# Patient Record
Sex: FEMALE | Race: BLACK OR AFRICAN AMERICAN | NOT HISPANIC OR LATINO
[De-identification: names, ages, dates, MRNs, and addresses within clinical notes are randomized per-mention and may not be internally consistent; named-entity substitution may affect disease eponyms.]

---

## 2017-07-28 ENCOUNTER — APPOINTMENT (OUTPATIENT)
Dept: NUCLEAR MEDICINE | Facility: HOSPITAL | Age: 57
End: 2017-07-28

## 2017-07-28 ENCOUNTER — OUTPATIENT (OUTPATIENT)
Dept: OUTPATIENT SERVICES | Facility: HOSPITAL | Age: 57
LOS: 1 days | Discharge: HOME | End: 2017-07-28
Payer: MEDICAID

## 2017-07-28 DIAGNOSIS — I10 ESSENTIAL (PRIMARY) HYPERTENSION: ICD-10-CM

## 2017-07-28 DIAGNOSIS — R07.9 CHEST PAIN, UNSPECIFIED: ICD-10-CM

## 2017-07-28 DIAGNOSIS — E78.5 HYPERLIPIDEMIA, UNSPECIFIED: ICD-10-CM

## 2017-07-28 DIAGNOSIS — R06.02 SHORTNESS OF BREATH: ICD-10-CM

## 2017-07-28 PROBLEM — Z00.00 ENCOUNTER FOR PREVENTIVE HEALTH EXAMINATION: Status: ACTIVE | Noted: 2017-07-28

## 2017-07-28 PROCEDURE — 78452 HT MUSCLE IMAGE SPECT MULT: CPT | Mod: 26

## 2021-03-03 ENCOUNTER — OUTPATIENT (OUTPATIENT)
Dept: OUTPATIENT SERVICES | Facility: HOSPITAL | Age: 61
LOS: 1 days | Discharge: HOME | End: 2021-03-03
Payer: MEDICARE

## 2021-03-03 DIAGNOSIS — F10.10 ALCOHOL ABUSE, UNCOMPLICATED: ICD-10-CM

## 2021-08-16 ENCOUNTER — TRANSCRIPTION ENCOUNTER (OUTPATIENT)
Age: 61
End: 2021-08-16

## 2021-11-23 ENCOUNTER — TRANSCRIPTION ENCOUNTER (OUTPATIENT)
Age: 61
End: 2021-11-23

## 2022-08-20 ENCOUNTER — NON-APPOINTMENT (OUTPATIENT)
Age: 62
End: 2022-08-20

## 2022-10-10 ENCOUNTER — NON-APPOINTMENT (OUTPATIENT)
Age: 62
End: 2022-10-10

## 2023-11-09 ENCOUNTER — EMERGENCY (EMERGENCY)
Facility: HOSPITAL | Age: 63
LOS: 0 days | Discharge: HOME | End: 2023-11-09
Attending: STUDENT IN AN ORGANIZED HEALTH CARE EDUCATION/TRAINING PROGRAM
Payer: COMMERCIAL

## 2023-11-09 VITALS
TEMPERATURE: 98 F | HEIGHT: 65 IN | OXYGEN SATURATION: 98 % | HEART RATE: 56 BPM | DIASTOLIC BLOOD PRESSURE: 79 MMHG | SYSTOLIC BLOOD PRESSURE: 150 MMHG | WEIGHT: 220.02 LBS | RESPIRATION RATE: 18 BRPM

## 2023-11-09 VITALS
SYSTOLIC BLOOD PRESSURE: 131 MMHG | OXYGEN SATURATION: 98 % | DIASTOLIC BLOOD PRESSURE: 70 MMHG | TEMPERATURE: 98 F | HEART RATE: 63 BPM | RESPIRATION RATE: 16 BRPM

## 2023-11-09 DIAGNOSIS — M54.50 LOW BACK PAIN, UNSPECIFIED: ICD-10-CM

## 2023-11-09 DIAGNOSIS — M25.512 PAIN IN LEFT SHOULDER: ICD-10-CM

## 2023-11-09 DIAGNOSIS — M25.511 PAIN IN RIGHT SHOULDER: ICD-10-CM

## 2023-11-09 DIAGNOSIS — Z98.890 OTHER SPECIFIED POSTPROCEDURAL STATES: Chronic | ICD-10-CM
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
## 2023-11-09 DIAGNOSIS — M54.2 CERVICALGIA: ICD-10-CM

## 2023-11-09 DIAGNOSIS — M54.6 PAIN IN THORACIC SPINE: ICD-10-CM

## 2023-11-09 PROCEDURE — 72125 CT NECK SPINE W/O DYE: CPT | Mod: 26,MA

## 2023-11-09 PROCEDURE — 72128 CT CHEST SPINE W/O DYE: CPT | Mod: 26,MA

## 2023-11-09 PROCEDURE — 99284 EMERGENCY DEPT VISIT MOD MDM: CPT

## 2023-11-09 PROCEDURE — 71250 CT THORAX DX C-: CPT | Mod: 26,MA

## 2023-11-09 RX ORDER — LIDOCAINE 4 G/100G
2 CREAM TOPICAL ONCE
Refills: 0 | Status: COMPLETED | OUTPATIENT
Start: 2023-11-09 | End: 2023-11-09

## 2023-11-09 RX ORDER — ATORVASTATIN CALCIUM 80 MG/1
1 TABLET, FILM COATED ORAL
Refills: 0 | DISCHARGE

## 2023-11-09 RX ORDER — LISINOPRIL 2.5 MG/1
1 TABLET ORAL
Refills: 0 | DISCHARGE

## 2023-11-09 RX ORDER — METHOCARBAMOL 500 MG/1
1 TABLET, FILM COATED ORAL
Qty: 20 | Refills: 0
Start: 2023-11-09 | End: 2023-11-13

## 2023-11-09 RX ORDER — AMLODIPINE BESYLATE 2.5 MG/1
1 TABLET ORAL
Refills: 0 | DISCHARGE

## 2023-11-09 RX ORDER — METHOCARBAMOL 500 MG/1
750 TABLET, FILM COATED ORAL ONCE
Refills: 0 | Status: COMPLETED | OUTPATIENT
Start: 2023-11-09 | End: 2023-11-09

## 2023-11-09 RX ORDER — METFORMIN HYDROCHLORIDE 850 MG/1
1 TABLET ORAL
Refills: 0 | DISCHARGE

## 2023-11-09 RX ORDER — ACETAMINOPHEN 500 MG
975 TABLET ORAL ONCE
Refills: 0 | Status: COMPLETED | OUTPATIENT
Start: 2023-11-09 | End: 2023-11-09

## 2023-11-09 RX ORDER — ASPIRIN/CALCIUM CARB/MAGNESIUM 324 MG
1 TABLET ORAL
Refills: 0 | DISCHARGE

## 2023-11-09 RX ORDER — RANOLAZINE 500 MG/1
1 TABLET, FILM COATED, EXTENDED RELEASE ORAL
Refills: 0 | DISCHARGE

## 2023-11-09 RX ORDER — OXYCODONE HYDROCHLORIDE 5 MG/1
1 TABLET ORAL
Qty: 9 | Refills: 0
Start: 2023-11-09 | End: 2023-11-11

## 2023-11-09 RX ORDER — METOPROLOL TARTRATE 50 MG
1 TABLET ORAL
Refills: 0 | DISCHARGE

## 2023-11-09 RX ORDER — LIDOCAINE 4 G/100G
1 CREAM TOPICAL
Qty: 10 | Refills: 0
Start: 2023-11-09 | End: 2023-11-18

## 2023-11-09 RX ADMIN — METHOCARBAMOL 750 MILLIGRAM(S): 500 TABLET, FILM COATED ORAL at 09:08

## 2023-11-09 RX ADMIN — Medication 975 MILLIGRAM(S): at 08:50

## 2023-11-09 RX ADMIN — Medication 975 MILLIGRAM(S): at 09:15

## 2023-11-09 RX ADMIN — LIDOCAINE 2 PATCH: 4 CREAM TOPICAL at 09:09
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
## 2023-11-09 NOTE — ED PROVIDER NOTE - CARE PROVIDER_API CALL
Yolanda Anand  Orthopaedic Surgery  [facility], [address] [address]  Phone: [phone]  Fax: [phone]  Follow Up Time:
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
## 2023-11-09 NOTE — ED PROVIDER NOTE - PATIENT PORTAL LINK FT
You can access the FollowMyHealth Patient Portal offered by [facility] by registering at the following website: [email or web address]. By joining [facility]’s FollowMyHealth portal, you will also be able to view your health information using other applications (apps) compatible with our system.

## 2023-11-09 NOTE — ED ADULT NURSE NOTE - NSICDXPASTMEDICALHX_GEN_ALL_CORE_FT
PAST MEDICAL HISTORY:  Acute myocardial infarction     DM (diabetes mellitus)     High cholesterol     HTN (hypertension)
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
## 2024-01-04 PROCEDURE — 90832 PSYTX W PT 30 MINUTES: CPT | Mod: 95

## 2024-01-11 PROCEDURE — 90832 PSYTX W PT 30 MINUTES: CPT | Mod: 95

## 2024-01-18 PROCEDURE — 90834 PSYTX W PT 45 MINUTES: CPT

## 2024-02-29 PROCEDURE — 90832 PSYTX W PT 30 MINUTES: CPT | Mod: 93

## 2024-03-12 PROCEDURE — 98968 PH1 ASSMT&MGMT NQHP 21-30: CPT

## 2024-03-19 PROCEDURE — 98968 PH1 ASSMT&MGMT NQHP 21-30: CPT

## 2024-04-09 PROCEDURE — 98967 PH1 ASSMT&MGMT NQHP 11-20: CPT

## 2024-05-30 PROCEDURE — 90832 PSYTX W PT 30 MINUTES: CPT

## 2024-06-19 PROCEDURE — 90832 PSYTX W PT 30 MINUTES: CPT | Mod: 95

## 2024-06-28 PROCEDURE — 98968 PH1 ASSMT&MGMT NQHP 21-30: CPT

## 2024-07-16 PROCEDURE — 98968 PH1 ASSMT&MGMT NQHP 21-30: CPT

## 2024-08-02 PROCEDURE — 98968 PH1 ASSMT&MGMT NQHP 21-30: CPT

## 2024-10-09 PROCEDURE — 90834 PSYTX W PT 45 MINUTES: CPT

## 2025-01-21 PROCEDURE — 90834 PSYTX W PT 45 MINUTES: CPT

## 2025-03-06 PROCEDURE — 90832 PSYTX W PT 30 MINUTES: CPT | Mod: 95

## 2025-03-27 PROCEDURE — 90832 PSYTX W PT 30 MINUTES: CPT | Mod: 95

## 2025-04-04 PROCEDURE — 90832 PSYTX W PT 30 MINUTES: CPT | Mod: 95
